# Patient Record
Sex: FEMALE | Race: BLACK OR AFRICAN AMERICAN
[De-identification: names, ages, dates, MRNs, and addresses within clinical notes are randomized per-mention and may not be internally consistent; named-entity substitution may affect disease eponyms.]

---

## 2017-04-16 ENCOUNTER — HOSPITAL ENCOUNTER (EMERGENCY)
Dept: HOSPITAL 17 - NEPD | Age: 47
Discharge: HOME | End: 2017-04-16
Payer: COMMERCIAL

## 2017-04-16 VITALS — BODY MASS INDEX: 34.6 KG/M2 | WEIGHT: 220.46 LBS | HEIGHT: 67 IN

## 2017-04-16 VITALS
SYSTOLIC BLOOD PRESSURE: 176 MMHG | DIASTOLIC BLOOD PRESSURE: 81 MMHG | HEART RATE: 84 BPM | OXYGEN SATURATION: 98 % | RESPIRATION RATE: 15 BRPM | TEMPERATURE: 98.2 F

## 2017-04-16 DIAGNOSIS — I10: ICD-10-CM

## 2017-04-16 DIAGNOSIS — E78.00: ICD-10-CM

## 2017-04-16 DIAGNOSIS — E11.9: ICD-10-CM

## 2017-04-16 DIAGNOSIS — M50.20: ICD-10-CM

## 2017-04-16 DIAGNOSIS — M25.511: Primary | ICD-10-CM

## 2017-04-16 PROCEDURE — 93005 ELECTROCARDIOGRAM TRACING: CPT

## 2017-04-16 NOTE — PD
HPI


.


right sided shoulder pain for over 1 week


Chief Complaint:  Pain: Acute or Chronic


Time Seen by Provider:  14:29


Travel History


International Travel<30 days:  No


Contact w/Intl Traveler<30days:  No


Traveled to known affect area:  No





History of Present Illness


HPI


46-year-old female with history of diabetes, hypertension and herniated disc 

here with complaints of right shoulder pain for one week.  Patient says that 

she has been experiencing right shoulder pain for about one week in duration.  

The pain is localized deep within the shoulder and is worse with movement.  She 

says sometimes it will radiate around to her breast area and then sometimes 

back to her neck and down.  Today she was having the pain and also had a 

headache and some blurry vision and decided to come to the emergency department 

for further evaluation.  At time of examination she is only complaining of 

right shoulder pain with movement.  She denies any headache.  Her neuro exam is 

unremarkable.  Rotator cuff test were all positive.  Her primary care provider 

is through Dr. De La O's office, but she is uncertain of the exact name of 

the provider.  She has no other complaints.





PFSH


Past Medical History


High Cholesterol:  Yes


Diabetes:  Yes


Diminished Hearing:  No


Hypertension:  Yes


Musculoskeletal:  Yes (HERNIATED DISC CERVICAL SPINE)


Immunizations Current:  No


:  8


Para:  1


Miscarriage:  7


Dilation and Curettage (D&C):  Yes (X3, unknown dates)





Past Surgical History


Abdominal Surgery:  Yes (HERNEA REPAIR)


 Section:  Yes (X1 )


Gynecologic Surgery:  Yes ( X 1, )


Other Surgery:  Yes (PREVIOUS CERCLAGE;D& C)





Social History


Alcohol Use:  Yes (OCC)


Tobacco Use:  No


Substance Use:  No





Allergies-Medications


(Allergen,Severity, Reaction):  


Coded Allergies:  


     Lisinopril (Verified  Allergy, Severe, Swelling, 17)


Reported Meds & Prescriptions





Reported Meds & Active Scripts


Active


Flexeril (Cyclobenzaprine HCl) 5 Mg Tab 5 Mg PO TID


Reported


Gomez Michaelsostar Pen Inj (Insulin Glargine) 300 Unit/Ml Pen 1 Units SQ 


Humalog Mix 75-25 Kwikpen Pen Inj (Insulin Lispro Protamine-Lispro 75-25 Inj) 

300 unit/3 ML Pen 1 Units SQ 


Amlodipine (Amlodipine Besylate) 2.5 Mg Tab 2.5 Mg PO DAILY


Glucophage XR (Metformin HCl) 500 Mg Libia 1,000 Mg PO DAILY


     With evening meal


Metoprolol Tartrate 25 Mg Tab 25 Mg PO BID


[Novolog Mix 75/25]   10 Units SQ TIDAC








Review of Systems


General / Constitutional:  No: Fever


Eyes:  No: Visual changes


HENT:  No: Headaches


Cardiovascular:  No: Chest Pain or Discomfort


Respiratory:  No: Shortness of Breath


Gastrointestinal:  No: Abdominal Pain


Genitourinary:  No: Dysuria


Musculoskeletal:  Positive: Pain (right shoulder pain)


Skin:  No Rash


Neurologic:  No: Weakness


Psychiatric:  No: Depression


Endocrine:  No: Polydipsia


Hematologic/Lymphatic:  No: Easy Bruising





Physical Exam


Narrative


GENERAL: AAO x 3, no acute distress, Well-nourished, well-developed patient. 

very comfortable, resting in bed


SKIN: Warm and dry. No visible rashes or bruising. 


HEAD: Normocephalic and atraumatic.


EYES: No scleral icterus. No injection or drainage. EOM intact, PERRLA


ENT: No nasal drainage noted. Mucous membranes pink. Airway patent. 


NECK: Supple, trachea midline. No JVD.  No lymphadenopathy


CARDIOVASCULAR: Regular rate and rhythm without murmurs, gallops, or rubs. 


RESPIRATORY: Breath sounds equal bilaterally. No accessory muscle use. No 

rhonchi or rales. 


GASTROINTESTINAL: Abdomen soft, non-tender, nondistended. 


EXTREMITIES: No cyanosis or edema.  All rotator cuff tests were positive on 

right side.  She has pain with internal/external rotation the right shoulder.  

Range of motion is normal.   strength is normal bilaterally.


NEURO: CN II through XII is intact,  strength normal bilaterally.  Upper 

and lower extremity strength 5/5.  motor function normal. no focal deficits 


BACK: Nontender without obvious deformity. No CVA tenderness.


PSYCH: AAO x 3, normal affect.





Data


Data


Last Documented VS





Vital Signs








  Date Time  Temp Pulse Resp B/P Pulse Ox O2 Delivery O2 Flow Rate FiO2


 


17 13:52 98.2 84 15 176/81 98   








Orders





 Electrocardiogram (17 )


Ibuprofen (Motrin) (17 15:00)


Blood Glucose (17 14:46)








MDM


Medical Decision Making


Medical Screen Exam Complete:  Yes


Emergency Medical Condition:  Yes


Medical Record Reviewed:  Yes


Differential Diagnosis


Possible Rotator cuff injury, shoulder pain possible osteoarthritis, cervical 

radiculopathy


Narrative Course


46-year-old female with history of diabetes, hypertension and herniated disc 

here with complaints of right shoulder pain for one week.  Patient says that 

she has been experiencing right shoulder pain for about one week in duration.  

The pain is localized deep within the shoulder and is worse with movement.  She 

says sometimes it will radiate around to her breast area and then sometimes 

back to her neck and down.  Today she was having the pain and also had a 

headache and some blurry vision and decided to come to the emergency department 

for further evaluation.  At time of examination she is only complaining of 

right shoulder pain with movement.  She denies any headache.  Her neuro exam is 

unremarkable.  Rotator cuff test were all positive.  Her primary care provider 

is through Dr. De La O's office, but she is uncertain of the exact name of 

the provider.  She has no other complaints.





Patient seen and examined.  Her examination is fairly unremarkable except for 

positive rotator cuff test on the right side.  I explained that imaging with an 

x-ray is not indicated.


I've explained to her that MRI would be the imaging modality of choice.  


I discussed the rotator cuff muscles with the patient.  She works in the 

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 

this injury.


I've explained that I will provide her with a course of muscle relaxers.  I 

offered some in the emergency department today, however she drove herself here 

and it will not be a safe discharge if she is administered muscle relaxers.


I've advised her that I will provide her with a prescription for muscle 

relaxers.  We discussed side effects of drowsiness.


I've advised her to follow-up with her primary care provider for further 

evaluation and treatment.  She may need orthopedist evaluation.








Patient verbalized understanding of instructions, questions were answered, and 

thanked me for their care. I advised them if their condition worsens, please 

return to the nearest emergency room for further care.





Diagnosis





 Primary Impression:  


 Right shoulder pain


 Qualified Code:  M25.511 - Acute pain of right shoulder


Patient Instructions:  General Instructions





***Additional Instructions:


Please return to emergency department if your symptoms return or worsen. 


Follow up with your primary care provider. 


Take medications as prescribed.





Muscle relaxers can cause drowsiness.  Do not drive, swim or operate heavy 

machinery while using these medications.


***Med/Other Pt SpecificInfo:  Prescription(s) given


Scripts


Cyclobenzaprine (Flexeril)5 Mg Tab5 Mg PO TID  #21 TAB


   Prov:Mesfin Sánchez MD         17


Disposition:  01 DISCHARGE HOME


Condition:  Serious








Iman Cho 2017 14:55

## 2017-04-17 NOTE — EKG
Date Performed: 04/16/2017       Time Performed: 14:03:54

 

PTAGE:      46 years

 

EKG:      Sinus rhythm 

 

 NORMAL ECG

 

PREVIOUS TRACING       : 11/04/2014 17.19 Compared to prior tracing no significant change

 

DOCTOR:   Martin Scott  Interpretating Date/Time  04/17/2017 11:47:32

## 2017-06-08 ENCOUNTER — HOSPITAL ENCOUNTER (EMERGENCY)
Dept: HOSPITAL 17 - NEPK | Age: 47
Discharge: HOME | End: 2017-06-08
Payer: COMMERCIAL

## 2017-06-08 VITALS
HEART RATE: 92 BPM | DIASTOLIC BLOOD PRESSURE: 90 MMHG | OXYGEN SATURATION: 99 % | SYSTOLIC BLOOD PRESSURE: 208 MMHG | RESPIRATION RATE: 20 BRPM | TEMPERATURE: 98.4 F

## 2017-06-08 VITALS — BODY MASS INDEX: 31.89 KG/M2 | HEIGHT: 66 IN | WEIGHT: 198.42 LBS
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
## 2017-06-08 DIAGNOSIS — S16.1XXA: Primary | ICD-10-CM

## 2017-06-08 PROCEDURE — 96372 THER/PROPH/DIAG INJ SC/IM: CPT

## 2017-06-08 PROCEDURE — 99284 EMERGENCY DEPT VISIT MOD MDM: CPT

## 2017-06-08 NOTE — PD
HPI


Chief Complaint:  Back/ Neck Pain or Injury


Time Seen by Provider:  15:08


Travel History


International Travel<30 days:  No


Contact w/Intl Traveler<30days:  No


Traveled to known affect area:  No





History of Present Illness


HPI


47-year-old female here for evaluation of left-sided posterior neck pain.  She 

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 

children.  There is new child yesterday at the patient reports was a lot of 

work to redirect.  She reports since then posterior left-sided neck pain is 

developed.  She reports sharp/throbbing pain which is worse with rotation of 

her neck.  She took some ibuprofen yesterday, didn't take anything today, 

symptoms persist which prompted evaluation.  Denies any headache, blurred vision

, chest pain or shortness of breath, nausea or vomiting, numbness or tingling 

or weakness in the extremities.  She has no other complaints.





PFSH


Past Medical History


High Cholesterol:  Yes


Diabetes:  Yes


Diminished Hearing:  No


Hypertension:  Yes


Musculoskeletal:  Yes (HERNIATED DISC CERVICAL SPINE)


Immunizations Current:  No


Pregnant?:  Not Pregnant


LMP:  2017


:  8


Para:  1


Miscarriage:  7


Dilation and Curettage (D&C):  Yes (X3, unknown dates)





Past Surgical History


Abdominal Surgery:  Yes (HERNEA REPAIR)


 Section:  Yes (X1 )


Gynecologic Surgery:  Yes ( X 1, )


Other Surgery:  Yes (PREVIOUS CERCLAGE;D& C)





Social History


Alcohol Use:  Yes (OCC)


Tobacco Use:  No


Substance Use:  No





Allergies-Medications


(Allergen,Severity, Reaction):  


Coded Allergies:  


     Lisinopril (Verified  Allergy, Severe, Swelling, 17)


Reported Meds & Prescriptions





Reported Meds & Active Scripts


Active


Flexeril (Cyclobenzaprine HCl) 5 Mg Tab 5 Mg PO TID


Reported


Gomez Michaelsostar Pen Inj (Insulin Glargine) 300 Unit/Ml Pen 1 Units SQ 


Humalog Mix 75-25 Kwikpen Pen Inj (Insulin Lispro Protamine-Lispro 75-25 Inj) 

300 unit/3 ML Pen 1 Units SQ 


Amlodipine (Amlodipine Besylate) 2.5 Mg Tab 2.5 Mg PO DAILY


Glucophage XR (Metformin HCl) 500 Mg Libia 1,000 Mg PO DAILY


     With evening meal


Metoprolol Tartrate 25 Mg Tab 25 Mg PO BID


[Novolog Mix 75/25]   10 Units SQ TIDAC








Review of Systems


Except as stated in HPI:  all other systems reviewed are Neg





Physical Exam


Narrative


GENERAL: Well-nourished female in no acute distress


SKIN: Warm and dry.  No bruising or soft tissue swelling


HEAD: Atraumatic. Normocephalic. 


EYES: Pupils equal and round. No scleral icterus. No injection or drainage. 


ENT: No nasal bleeding or discharge.  Mucous membranes pink and moist.


NECK: Trachea midline. No JVD.  No lymphadenopathy or palpable masses.


CARDIOVASCULAR: Regular rate and rhythm.  No murmur appreciated.


RESPIRATORY: No accessory muscle use. Clear to auscultation. Breath sounds 

equal bilaterally. 


GASTROINTESTINAL: Abdomen soft, non-tender, nondistended. Hepatic and splenic 

margins not palpable. 


MUSCULOSKELETAL: No obvious deformities.  Some reproducible tenderness to 

palpation to the left cervical paravertebral musculature.  There is pain with 

rotation of the neck limits her ability to rotate her neck.


NEUROLOGICAL: Awake and alert. No obvious cranial nerve deficits.  Motor 

grossly within normal limits. Normal speech.





Data


Data


Last Documented VS





Vital Signs








  Date Time  Temp Pulse Resp B/P Pulse Ox O2 Delivery O2 Flow Rate FiO2


 


17 14:37 98.4 92 20 208/90 99 Room Air  








Orders





 Ketorolac Inj (Toradol Inj) (17 15:15)








MDM


Medical Decision Making


Medical Screen Exam Complete:  Yes


Emergency Medical Condition:  Yes


Medical Record Reviewed:  Yes


Differential Diagnosis


Cervical strain, spasm, referred cardiac pain, herniated nucleus pulposus, 

fracture, cervical artery dissection


Narrative Course


Physical examination and history are consistent with left-sided neck strain.  

The plan would be to treat her symptomatically with short course of NSAIDs and 

muscle relaxants.  Her blood pressure was incidentally noted to be elevated in 

triage.  She has a history of hypertension hypertension, she takes metoprolol 

50 mg BID, losartan/HCTZ 72853, amlodipine 5 mg daily.  She forgot to take her 

medication this morning.  She will be taking it shortly after discharge.





Diagnosis





 Primary Impression:  


 Cervical strain, acute


 Qualified Code:  S16.1XXA - Cervical strain, acute, initial encounter





***Additional Instructions:


Medication as needed.  Take diclofenac with meals.  Do not take ibuprofen/Advil/

Motrin/Aleve/naproxen when taking diclofenac because they are in the same class 

of medication.  Do not drive or drink alcohol when taking baclofen.  Avoid 

strenuous activity.  Gentle massages, ice packs, heating pads.  Follow-up with 

primary care physician 1 week.  Return for any emergent medical conditions.


***Med/Other Pt SpecificInfo:  Prescription(s) given


Scripts


Baclofen 10 Mg Tab10 Mg PO Q8HR PRN (MUSCLE SPASM) 7 Days  Ref 0


   Prov:Rambo Powell MD         17 


Diclofenac Sodium DR 75 Mg Tabdr75 Mg PO BID  7 Days  Ref 0


   Prov:Rambo Powell MD         17


Disposition:  01 DISCHARGE HOME


Condition:  Stable








Enzo Del Toro 2017 15:23

## 2017-10-16 ENCOUNTER — HOSPITAL ENCOUNTER (EMERGENCY)
Dept: HOSPITAL 17 - NEPK | Age: 47
Discharge: HOME | End: 2017-10-16
Payer: COMMERCIAL

## 2017-10-16 VITALS
SYSTOLIC BLOOD PRESSURE: 162 MMHG | HEART RATE: 90 BPM | DIASTOLIC BLOOD PRESSURE: 80 MMHG | TEMPERATURE: 98.8 F | RESPIRATION RATE: 14 BRPM | OXYGEN SATURATION: 98 %

## 2017-10-16 VITALS — HEIGHT: 66 IN | WEIGHT: 198.42 LBS | BODY MASS INDEX: 31.89 KG/M2

## 2017-10-16 DIAGNOSIS — M25.511: Primary | ICD-10-CM

## 2017-10-16 DIAGNOSIS — E11.9: ICD-10-CM

## 2017-10-16 DIAGNOSIS — I10: ICD-10-CM

## 2017-10-16 DIAGNOSIS — E78.00: ICD-10-CM
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
## 2017-10-16 PROCEDURE — 73030 X-RAY EXAM OF SHOULDER: CPT

## 2017-10-16 PROCEDURE — 99283 EMERGENCY DEPT VISIT LOW MDM: CPT

## 2017-10-16 NOTE — PD
HPI


Chief Complaint:  Musculoskeletal Complaint


Time Seen by Provider:  16:11


Travel History


International Travel<30 days:  No


Contact w/Intl Traveler<30days:  No


Traveled to known affect area:  No





History of Present Illness


HPI


47 year-old female presents to the emergency department for evaluation right 

shoulder pain.  Patient had a slip and fall yesterday and landed on her right 

shoulder.  She states since that fall she has been unable to move it without 

significant pain.  Pain is a constant, 6 out of 10.  Pain is less intense when 

she holds her arm across her body.  Denies any alterations in sensation or 

limitations in range of motion.  Pain does not radiate anywhere.  Did not 

strike her head or lose consciousness.  She has no other symptoms to report.





[facility]


Past Medical History


High Cholesterol:  Yes


Diabetes:  Yes


Diminished Hearing:  No


Hypertension:  Yes


Musculoskeletal:  Yes (HERNIATED DISC CERVICAL SPINE)


Immunizations Current:  No


Pregnant?:  Unknown


:  8


Para:  1


Miscarriage:  7


Dilation and Curettage (D&C):  Yes (X3, unknown dates)





Past Surgical History


Abdominal Surgery:  Yes (HERNEA REPAIR)


 Section:  Yes (X1 )


Gynecologic Surgery:  Yes ( X 1, )


Other Surgery:  Yes (PREVIOUS CERCLAGE;D& C)





Social History


Alcohol Use:  Yes (OCC)


Tobacco Use:  No


Substance Use:  No





Allergies-Medications


(Allergen,Severity, Reaction):  


Coded Allergies:  


     lisinopril (Unverified  Allergy, Severe, Swelling, 17)


Reported Meds & Prescriptions





Reported Meds & Active Scripts


Active


Mobic (Meloxicam) 15 Mg Tab 15 Mg PO DAILY PRN


Baclofen 10 Mg Tab 10 Mg PO Q8HR PRN 7 Days


Diclofenac Sodium DR (Diclofenac Sodium) 75 Mg Tabdr 75 Mg PO BID 7 Days


Flexeril (Cyclobenzaprine HCl) 5 Mg Tab 5 Mg PO TID


Reported


Toujeo Solostar Pen Inj (Insulin Glargine) 300 Unit/Ml Pen 1 Units SQ 


Humalog Mix 75-25 Kwikpen Pen Inj (Insulin Lispro Protamine-Lispro 75-25 Inj) 

300 unit/3 ML Pen 1 Units SQ 


Amlodipine (Amlodipine Besylate) 2.5 Mg Tab 2.5 Mg PO DAILY


Glucophage XR (Metformin HCl) 500 Mg Libia 1,000 Mg PO DAILY


     With evening meal


Metoprolol Tartrate 25 Mg Tab 25 Mg PO BID


[Novolog Mix 75/25]   10 Units SQ TIDAC








Review of Systems


Except as stated in HPI:  all other systems reviewed are Neg





Physical Exam


Narrative


GENERAL: Well-nourished, well-developed female patient, ambulatory no acute 

distress


SKIN: Focused skin assessment warm/dry.


HEAD: Normocephalic.  Atraumatic


EYES: No scleral icterus. No injection or drainage. 


NECK: Supple, trachea midline. No JVD or lymphadenopathy.


CARDIOVASCULAR: Regular rate and rhythm without murmurs, gallops, or rubs. 


RESPIRATORY: Breath sounds equal bilaterally. No accessory muscle use.


GASTROINTESTINAL: Abdomen soft, non-tender, nondistended. 


MUSCULOSKELETAL: No cyanosis, or edema.  Her is elicited to palpation of the 

anterolateral aspect the right shoulder.  No deformity.  Patient is reluctant 

to abduct or raised anteriorly due to pain.  Distal pulses are palpable.  Cap 

refill within normal limits.


BACK: Nontender without obvious deformity. No CVA tenderness.





Data


Data


Last Documented VS





Vital Signs








  Date Time  Temp Pulse Resp B/P (MAP) Pulse Ox O2 Delivery O2 Flow Rate FiO2


 


10/16/17 17:23        


 


10/16/17 14:15 98.8 90 14  98   








Orders





 Orders


Shoulder, Complete (>2vws) (10/16/17 )


Ibuprofen (Motrin) (10/16/17 16:15)


Splint Or Brace Apply/Monitor (10/16/17 16:54)


Ed Discharge Order (10/16/17 16:56)








MDM


Medical Decision Making


Medical Screen Exam Complete:  Yes


Emergency Medical Condition:  Yes


Medical Record Reviewed:  Yes


Differential Diagnosis


Shoulder sprain versus dislocation versus contusion versus fracture


Narrative Course


47 year-old female presents to the emergency department for evaluation right 

shoulder pain.  This is following a trip and fall that occurred yesterday.  

There is no deformity.  Patient does report pain over the anterolateral aspect 

of the right shoulder.








Last Impressions








Shoulder X-Ray 10/16/17 0000 Signed





Impressions: 





 Service Date/Time:  2017 16:30 - CONCLUSION:  1. 





 Degenerative changes in the acromioclavicular joint. 2. No acute fracture 





 identified.     Andrae Bowden MD 





Findings or discussed with the patient.  She is provided a sling and counseled 

on care.  She is encouraged to follow-up with primary care provider and return 

immediately with any acute worsening symptoms.





Diagnosis





 Primary Impression:  


 Right shoulder pain


 Qualified Codes:  M25.511 - Pain in right shoulder


Referrals:  


Orthopaedic Surgeon





Primary Care Physician


Patient Instructions:  General Instructions, Shoulder Pain (GEN)





***Additional Instructions:  


Ice to the affected area 20 minutes on, 20 minutes off


Follow-up with primary care provider


Take orthopedic evaluation.  Outpatient MRI may be warranted


Wear sling for support.  Do not do this at all times and do range of motion 

exercises multiple times a day


Do not take ibuprofen or other NSAIDs with prescribed pain control


Return immediately to the emergency department with any acute worsening of 

symptoms


***Med/Other Pt SpecificInfo:  Prescription(s) given


Scripts


Meloxicam (Mobic) 15 Mg Tab


15 MG PO DAILY Y for PAIN SCALE 1 TO 10, #14 TAB 0 Refills


   Prov: Yu Ramos         10/16/17


Disposition:  01 DISCHARGE HOME


Condition:  Stable











Yu Ramos Oct 16, 2017 16:12

## 2017-10-16 NOTE — RADRPT
EXAM DATE/TIME:  10/16/2017 16:30 

 

[facility] COMPARISON:     

CHEST SINGLE AP, 2014, 18:40.

 

                     

INDICATIONS :     

Right anterior shoulder pain, injured catching self from falling

                     

 

MEDICAL HISTORY :       

Diabetes mellitus type II.     Hypercholesterolemia. Hypertension. Herniated disc in cervical spine, 
  

 

SURGICAL HISTORY :        

 section. Dilation and Curettage x 3, right wrist tendons removed

 

ENCOUNTER:     

Initial                                        

 

ACUITY:     

2 days      

 

PAIN SCORE:     

9/10

 

LOCATION:     

Right  Shoulder

 

FINDINGS:     

The humeral head is well situated within the glenoid fossa.

 

There degenerative changes within the a.c. joint.

 

No acute fracture is seen.

 

CONCLUSION:     

1. Degenerative changes in the acromioclavicular joint.

2. No acute fracture identified.

 

 

 

 Andrae Bowden MD on 2017 at 16:45           

Board Certified Radiologist.

 This report was verified electronically.

## 2018-04-09 ENCOUNTER — HOSPITAL ENCOUNTER (EMERGENCY)
Dept: HOSPITAL 17 - NEPC | Age: 48
LOS: 1 days | Discharge: HOME | End: 2018-04-10
Payer: COMMERCIAL

## 2018-04-09 VITALS — HEIGHT: 66 IN | WEIGHT: 205.43 LBS | BODY MASS INDEX: 33.01 KG/M2

## 2018-04-09 VITALS
RESPIRATION RATE: 18 BRPM | HEART RATE: 83 BPM | DIASTOLIC BLOOD PRESSURE: 88 MMHG | OXYGEN SATURATION: 99 % | SYSTOLIC BLOOD PRESSURE: 140 MMHG

## 2018-04-09 VITALS
OXYGEN SATURATION: 99 % | RESPIRATION RATE: 18 BRPM | HEART RATE: 81 BPM | TEMPERATURE: 99.5 F | SYSTOLIC BLOOD PRESSURE: 179 MMHG | DIASTOLIC BLOOD PRESSURE: 86 MMHG

## 2018-04-09 DIAGNOSIS — E11.9: ICD-10-CM

## 2018-04-09 DIAGNOSIS — J18.1: Primary | ICD-10-CM

## 2018-04-09 DIAGNOSIS — Z79.4: ICD-10-CM

## 2018-04-09 DIAGNOSIS — Z79.899: ICD-10-CM

## 2018-04-09 DIAGNOSIS — R06.02: ICD-10-CM

## 2018-04-09 DIAGNOSIS — I10: ICD-10-CM

## 2018-04-09 LAB
BASOPHILS # BLD AUTO: 0 TH/MM3 (ref 0–0.2)
BASOPHILS NFR BLD: 0.4 % (ref 0–2)
BUN SERPL-MCNC: 9 MG/DL (ref 7–18)
CALCIUM SERPL-MCNC: 8.4 MG/DL (ref 8.5–10.1)
CHLORIDE SERPL-SCNC: 106 MEQ/L (ref 98–107)
CREAT SERPL-MCNC: 0.71 MG/DL (ref 0.5–1)
EOSINOPHIL # BLD: 0.1 TH/MM3 (ref 0–0.4)
EOSINOPHIL NFR BLD: 1 % (ref 0–4)
ERYTHROCYTE [DISTWIDTH] IN BLOOD BY AUTOMATED COUNT: 14.4 % (ref 11.6–17.2)
GFR SERPLBLD BASED ON 1.73 SQ M-ARVRAT: 106 ML/MIN (ref 89–?)
GLUCOSE SERPL-MCNC: 161 MG/DL (ref 74–106)
HCO3 BLD-SCNC: 27.6 MEQ/L (ref 21–32)
HCT VFR BLD CALC: 36.8 % (ref 35–46)
HGB BLD-MCNC: 12.2 GM/DL (ref 11.6–15.3)
LYMPHOCYTES # BLD AUTO: 1.7 TH/MM3 (ref 1–4.8)
LYMPHOCYTES NFR BLD AUTO: 21.6 % (ref 9–44)
MAGNESIUM SERPL-MCNC: 1.6 MG/DL (ref 1.5–2.5)
MCH RBC QN AUTO: 28 PG (ref 27–34)
MCHC RBC AUTO-ENTMCNC: 33 % (ref 32–36)
MCV RBC AUTO: 84.6 FL (ref 80–100)
MONOCYTE #: 0.8 TH/MM3 (ref 0–0.9)
MONOCYTES NFR BLD: 10.8 % (ref 0–8)
NEUTROPHILS # BLD AUTO: 5.1 TH/MM3 (ref 1.8–7.7)
NEUTROPHILS NFR BLD AUTO: 66.2 % (ref 16–70)
PLATELET # BLD: 169 TH/MM3 (ref 150–450)
PMV BLD AUTO: 9.9 FL (ref 7–11)
RBC # BLD AUTO: 4.35 MIL/MM3 (ref 4–5.3)
SODIUM SERPL-SCNC: 140 MEQ/L (ref 136–145)
TROPONIN I SERPL-MCNC: (no result) NG/ML (ref 0.02–0.05)
WBC # BLD AUTO: 7.7 TH/MM3 (ref 4–11)

## 2018-04-09 PROCEDURE — 87040 BLOOD CULTURE FOR BACTERIA: CPT

## 2018-04-09 PROCEDURE — 83735 ASSAY OF MAGNESIUM: CPT

## 2018-04-09 PROCEDURE — 71046 X-RAY EXAM CHEST 2 VIEWS: CPT

## 2018-04-09 PROCEDURE — 96374 THER/PROPH/DIAG INJ IV PUSH: CPT

## 2018-04-09 PROCEDURE — 96375 TX/PRO/DX INJ NEW DRUG ADDON: CPT

## 2018-04-09 PROCEDURE — 84443 ASSAY THYROID STIM HORMONE: CPT

## 2018-04-09 PROCEDURE — 84484 ASSAY OF TROPONIN QUANT: CPT

## 2018-04-09 PROCEDURE — 99285 EMERGENCY DEPT VISIT HI MDM: CPT

## 2018-04-09 PROCEDURE — 80048 BASIC METABOLIC PNL TOTAL CA: CPT

## 2018-04-09 PROCEDURE — 85025 COMPLETE CBC W/AUTO DIFF WBC: CPT

## 2018-04-09 PROCEDURE — 82550 ASSAY OF CK (CPK): CPT

## 2018-04-09 PROCEDURE — 71275 CT ANGIOGRAPHY CHEST: CPT

## 2018-04-09 PROCEDURE — 83880 ASSAY OF NATRIURETIC PEPTIDE: CPT

## 2018-04-09 PROCEDURE — 82552 ASSAY OF CPK IN BLOOD: CPT

## 2018-04-09 NOTE — RADRPT
EXAM DATE/TIME:  04/09/2018 18:28 

 

[facility] COMPARISON:     

No previous studies available for comparison.

 

                     

INDICATIONS :     

Shortness of breath and bilateral leg swelling.

                     

 

MEDICAL HISTORY :     

Hypertension.       Diabetes.   

 

SURGICAL HISTORY :     

None.   

 

ENCOUNTER:     

Initial                                        

 

ACUITY:     

2 weeks      

 

PAIN SCORE:     

0/10

 

LOCATION:     

Bilateral chest 

 

FINDINGS:     

PA and lateral views of the chest demonstrate patchy airspace consolidation right lung base most cari
acteristic of bronchopneumonia. Minimal left basilar opacity. No effusion. Elevated right hemidiaphra
gm. No pneumothorax.

 

CONCLUSION:     

1. Basilar lung consolidation, right greater than left most characteristic of bronchopneumonia.

 

 

 

 Robert Ellis MD on April 09, 2018 at 18:47           

Board Certified Radiologist.

 This report was verified electronically.

## 2018-04-10 NOTE — RADRPT
EXAM DATE/TIME:  04/10/2018 01:45 

 

[facility] COMPARISON:     

CHEST PA & LAT, April 09, 2018, 18:28.

 

 

INDICATIONS :     

Shortness of breath with foot swelling.

                      

 

IV CONTRAST:     

70 cc Omnipaque 350 (iohexol) IV 

 

 

RADIATION DOSE:     

10.56 CTDIvol (mGy) 

 

 

MEDICAL HISTORY :     

Hypertension. Diabetes mellitus type 2. 

 

SURGICAL HISTORY :       

Hernia repair

 

ENCOUNTER:      

Initial

 

ACUITY:      

1 day

 

PAIN SCALE:      

0/10

 

LOCATION:        

chest 

 

TECHNIQUE:     

Volumetric scanning of the chest was performed using a pulmonary embolism protocol MIP images were re
constructed.  Using automated exposure control and adjustment of the mA and/or kV according to patien
t size, radiation dose was kept as low as reasonably achievable to obtain optimal diagnostic quality 
images.   DICOM format image data is available electronically for review and comparison.  

 

Follow-up recommendations for detected pulmonary nodules are based at a minimum on nodule size and pa
tient risk factors according to Fleischner Society Guidelines.

 

FINDINGS:     

 

PULMONARY ARTERIES:

No filling defects are seen in the pulmonary arteries through the segmental level.

 

LUNGS:     

There is right middle lobe consolidation without pneumothorax .  No concerning pulmonary nodule is vi
sualized.

 

PLEURAE:     

There is no pleural thickening or pleural effusion.

 

MEDIASTINUM:     

There is good visualization of the great vessels of the middle mediastinum.  No evidence of mediastin
al or hilar adenopathy/mass.

 

MUSCULOSKELETAL:     

Within normal limits for patient age.

 

MISCELLANEOUS:     

The visualized upper abdominal organs demonstrate no acute abnormality.

 

CONCLUSION:     

1. Right middle lobe pneumonia.

2. No evidence for pulmonary embolism.

 

 

 

 

 Marquez Tatum MD on April 10, 2018 at 2:00           

Board Certified Radiologist.

 This report was verified electronically.

## 2018-06-13 ENCOUNTER — HOSPITAL ENCOUNTER (EMERGENCY)
Dept: HOSPITAL 17 - NEPD | Age: 48
Discharge: HOME | End: 2018-06-13
Payer: SELF-PAY

## 2018-06-13 VITALS
TEMPERATURE: 98.1 F | OXYGEN SATURATION: 100 % | RESPIRATION RATE: 16 BRPM | HEART RATE: 93 BPM | DIASTOLIC BLOOD PRESSURE: 80 MMHG | SYSTOLIC BLOOD PRESSURE: 173 MMHG

## 2018-06-13 VITALS — WEIGHT: 200.62 LBS | HEIGHT: 66 IN | BODY MASS INDEX: 32.24 KG/M2

## 2018-06-13 DIAGNOSIS — E11.9: ICD-10-CM

## 2018-06-13 DIAGNOSIS — Z79.4: ICD-10-CM

## 2018-06-13 DIAGNOSIS — E78.00: ICD-10-CM

## 2018-06-13 DIAGNOSIS — I10: ICD-10-CM

## 2018-06-13 DIAGNOSIS — Z87.39: ICD-10-CM

## 2018-06-13 DIAGNOSIS — M75.91: ICD-10-CM

## 2018-06-13 DIAGNOSIS — M75.41: Primary | ICD-10-CM

## 2018-06-13 PROCEDURE — 99283 EMERGENCY DEPT VISIT LOW MDM: CPT

## 2018-06-13 NOTE — PD
HPI


Chief Complaint:  Injury


Time Seen by Provider:  01:01


Travel History


International Travel<30 days:  No


Contact w/Intl Traveler<30days:  No


Traveled to known affect area:  No





History of Present Illness


HPI


48-year-old right-hand dominant black female presents emergency department with 

complaints of right shoulder pain.  She states that her right shoulder started 

hurting the last 24 hours.  She denies any direct trauma or injury.  She states 

the pain appears to be more anterior shoulder.  Worse with raising her hand up 

over her head.  Some relief with holding her arm against her body.  She denies 

any prior shoulder problems.





[facility]


Past Medical History


*** Narrative Medical


Diabetes, hypercholesterolemia, hypertension, degenerative disc disease


Cardiovascular Problems:  Yes (HTN)


High Cholesterol:  Yes


Diabetes:  Yes


Patient Takes Glucophage:  Yes


Diminished Hearing:  No


Hypertension:  Yes


Musculoskeletal:  Yes (HERNIATED DISC CERVICAL SPINE)


Immunizations Current:  No


Tetanus Vaccination:  > 5 Years


Influenza Vaccination:  Yes


Pregnant?:  Not Pregnant


LMP:  LAST WEEK


:  8


Para:  1


Miscarriage:  7


Dilation and Curettage (D&C):  Yes (X3, unknown dates)





Past Surgical History


Abdominal Surgery:  Yes (HERNEA REPAIR)


 Section:  Yes (X1 )


Gynecologic Surgery:  Yes (PREVIOUS CERCLAGE)


Other Surgery:  Yes (PREVIOUS CERCLAGE;D& C)





Social History


Alcohol Use:  Yes (OCC)


Tobacco Use:  No


Substance Use:  No





Allergies-Medications


(Allergen,Severity, Reaction):  


Coded Allergies:  


     lisinopril (Unverified  Allergy, Severe, Swelling, 18)


Reported Meds & Prescriptions





Reported Meds & Active Scripts


Active


Diclofenac Sodium DR (Diclofenac Sodium) 75 Mg Tabdr 75 Mg PO BID


Proair Hfa 8.5 GM Inh (Albuterol Sulfate) 90 Mcg/Act Aer 2 Puff INH Q4-6H PRN


     108 mcg/actuation


Reported


Voltaren (Diclofenac Sodium) 1 % Gel..gram. 1 Applic TOPICAL QID PRN


Novolog Inj (Insulin Aspart) 1,000 Unit/10 Ml Vial 10 Units SQ TIDAC


Tresiba Flextouch Pen Inj (Insulin Degludec Inj) 300 unit/3 ML Pen 50 Units SQ 

HS


[sugar blocker]   1 Cap PO TID


Metformin (Metformin HCl) 1,000 Mg Tab 1,000 Mg PO BIDPC


Tradjenta (Linagliptin) 5 Mg Tab 5 Mg PO DAILY


Amlodipine (Amlodipine Besylate) 5 Mg Tab 5 Mg PO DAILY


Metoprolol Tartrate 100 Mg Tab 50 Mg PO BID








Review of Systems


General / Constitutional:  No: Fever


Eyes:  No: Visual changes


HENT:  No: Headaches


Cardiovascular:  No: Chest Pain or Discomfort


Respiratory:  No: Shortness of Breath


Gastrointestinal:  No: Abdominal Pain


Genitourinary:  No: Dysuria


Musculoskeletal:  Positive: Arthralgias, Limited ROM, Pain


Skin:  No Rash


Neurologic:  No: Weakness


Psychiatric:  No: Depression


Endocrine:  No: Polydipsia


Hematologic/Lymphatic:  No: Easy Bruising





Physical Exam


Narrative


GENERAL:  Well-developed, well-nourished in no acute distress. Nontoxic 

appearing.


HEAD: Normocephalic, atraumatic.


EYES: Pupils equal round and reactive. Extraocular motions intact. No scleral 

icterus. No injection or drainage. 


ENT: TMs clear without erythema.  The external auditory canals clear.  Nose: 

clear .  Posterior pharynx is pink and moist.  No tonsillar edema or exudate.  

Uvula midline. Airway patent.


NECK: Trachea midline.Supple, nontender, moves head freely.  No central bony 

tenderness or spasm.


CARDIOVASCULAR: Regular rate and rhythm without murmurs, gallops, or rubs. 


RESPIRATORY: Clear to auscultation. Breath sounds equal bilaterally. No wheezes

, rales, or rhonchi.  


GASTROINTESTINAL: Abdomen soft, non-tender, nondistended. No hepato-splenomegaly

, or palpable masses. No guarding.


EXTREMITIES: No clubbing, cyanosis, or edema.  Examination of the right upper 

extremity reveals pain to the anterior component of the shoulder and the 

anterior glenohumeral joint.  She has a positive apprehension test.  Decreased 

range of motion due to pain.  No erythema, warmth or edema.  Negative drop 

test.  Intact median/ulnar/radial nerves.


BACK: Nontender without deformity or crepitance. No flank tenderness.





Data


Data


Last Documented VS





Vital Signs








  Date Time  Temp Pulse Resp B/P (MAP) Pulse Ox O2 Delivery O2 Flow Rate FiO2


 


18 00:49 98.1 93 16 173/80 (111) 100   








Orders





 Orders


Acetamin-Hydrocod 325-5 Mg (Norco  5-325 (18 01:15)


Naproxen (Naprosyn) (18 01:15)


Ed Discharge Order (18 01:08)








[facility]


Medical Decision Making


Medical Screen Exam Complete:  Yes


Emergency Medical Condition:  Yes


Medical Record Reviewed:  Yes


Differential Diagnosis


MDM: High


Differential diagnoses: Fracture, sprain, strain, dislocation, contusion, 

neurovascular injury, tendinitis, impingement syndrome


Narrative Course


Patient is given Norco 5 mg p.o. and Naprosyn 500 mg p.o.





This isRight shoulder tendinitis/impingement syndrome





Diagnosis





 Primary Impression:  


 Impingement syndrome of right shoulder


 Additional Impression:  


 Right shoulder tendinitis


Patient Instructions:  General Instructions





***Additional Instructions:  


Rest.


Range of motion exercises.


Ice for any acute swelling and pain.


Diclofenac.


Follow-up with your doctor for physical therapy.


Return to the ER for emergencies.


***Med/Other Pt SpecificInfo:  Prescription(s) given


Scripts


Diclofenac Sodium DR (Diclofenac Sodium DR) 75 Mg Tabdr


75 MG PO BID, #30 TAB 0 Refills


   Prov: Rambo Powell MD         18


Disposition:  01 DISCHARGE HOME


Condition:  Stable











Robert Raman 2018 01:14